# Patient Record
Sex: FEMALE | Race: WHITE | NOT HISPANIC OR LATINO | Employment: FULL TIME | URBAN - METROPOLITAN AREA
[De-identification: names, ages, dates, MRNs, and addresses within clinical notes are randomized per-mention and may not be internally consistent; named-entity substitution may affect disease eponyms.]

---

## 2018-07-26 ENCOUNTER — TRANSCRIBE ORDERS (OUTPATIENT)
Dept: ADMINISTRATIVE | Facility: HOSPITAL | Age: 23
End: 2018-07-26

## 2018-07-26 DIAGNOSIS — N63.0 LUMP OR MASS IN BREAST: Primary | ICD-10-CM

## 2018-07-30 ENCOUNTER — APPOINTMENT (OUTPATIENT)
Dept: ULTRASOUND IMAGING | Facility: HOSPITAL | Age: 23
End: 2018-07-30
Attending: NURSE PRACTITIONER

## 2018-07-31 ENCOUNTER — HOSPITAL ENCOUNTER (OUTPATIENT)
Dept: ULTRASOUND IMAGING | Facility: HOSPITAL | Age: 23
Discharge: HOME | End: 2018-07-31
Attending: NURSE PRACTITIONER | Admitting: NURSE PRACTITIONER

## 2018-07-31 DIAGNOSIS — N63.0 LUMP OR MASS IN BREAST: ICD-10-CM

## 2018-07-31 PROCEDURE — 76642 ULTRASOUND BREAST LIMITED: CPT | Performed by: RADIOLOGY

## 2018-07-31 PROCEDURE — 76642 ULTRASOUND BREAST LIMITED: CPT

## 2019-10-02 ENCOUNTER — TRANSCRIBE ORDERS (OUTPATIENT)
Dept: ADMINISTRATIVE | Facility: HOSPITAL | Age: 24
End: 2019-10-02

## 2019-10-02 DIAGNOSIS — N63.21 BREAST LUMP ON LEFT SIDE AT 1 O'CLOCK POSITION: Primary | ICD-10-CM

## 2019-10-11 ENCOUNTER — APPOINTMENT (OUTPATIENT)
Dept: ULTRASOUND IMAGING | Facility: HOSPITAL | Age: 24
End: 2019-10-11

## 2022-01-26 ENCOUNTER — OFFICE VISIT (OUTPATIENT)
Dept: CARDIOLOGY | Facility: CLINIC | Age: 27
End: 2022-01-26

## 2022-01-26 VITALS
DIASTOLIC BLOOD PRESSURE: 58 MMHG | WEIGHT: 126 LBS | TEMPERATURE: 97.8 F | HEART RATE: 80 BPM | HEIGHT: 63 IN | SYSTOLIC BLOOD PRESSURE: 110 MMHG | BODY MASS INDEX: 22.32 KG/M2

## 2022-01-26 DIAGNOSIS — R01.1 MURMUR, CARDIAC: ICD-10-CM

## 2022-01-26 DIAGNOSIS — I34.0 MITRAL VALVE INSUFFICIENCY, UNSPECIFIED ETIOLOGY: ICD-10-CM

## 2022-01-26 DIAGNOSIS — R00.2 PALPITATIONS: ICD-10-CM

## 2022-01-26 DIAGNOSIS — R06.02 SHORTNESS OF BREATH: Primary | ICD-10-CM

## 2022-01-26 DIAGNOSIS — R07.2 PRECORDIAL PAIN: ICD-10-CM

## 2022-01-26 PROCEDURE — 93000 ELECTROCARDIOGRAM COMPLETE: CPT | Performed by: INTERNAL MEDICINE

## 2022-01-26 PROCEDURE — 99204 OFFICE O/P NEW MOD 45 MIN: CPT | Performed by: INTERNAL MEDICINE

## 2022-01-26 NOTE — PROGRESS NOTES
Chief Complaint   Patient presents with   • Establish Care     PCP ref, murmur,palpitations   • Heart Murmur     newly diagnosed by PCP   • Shortness of Breath     with exertion at times, exercises 4-5 days a week, will occurs with some episodes but maybe not all, can occur just climbing stairs at times, symptoms resolve after resting   • Palpitations     started 6-8 months ago, contributed to being in PPE gear and under a lot of stress, has recently been having some dizziness associated with the palpitations which was more concerning. Now occuring daily   • Cardiac history     wore a monitor a few years ago at cardiololgy office in [place], requesting results. Has never had any other testing other than random EKG.    • Labs     most recent labs in chart        CARDIAC COMPLAINTS  dyspnea, palpitations and Heart Murmur      Subjective   Inocencia Mobley is a 26 y.o. female came in today for her initial cardiac evaluation.  She had no previously diagnosed cardiac history other than palpitation which has been going on for few years.  She was seen by a cardiologist in [place] few years ago and had monitor placed which did not show any significant arrhythmia.  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  She started noticing increasing shortness of breath and increasing palpitation.  The shortness of breath does get worse when she is wearing her PPE gear.  She is also has been having a lot of stress working in the Covid unit she started noticing shortness of breath which occurs mostly when she exercise and sometime it occurs even minimal exertion as climbing stairs.  The symptoms do get better with rest she does noticed the heart skipping and racing at that time.  She does notice some occasional dizziness.  When she was seen at your office he also heard a heart murmur which is new to her.  She denies having any problems as a child other than having recommend strep throat.  She  did undergo a tonsillectomy at the age of 21 for severe tonsillitis secondary to strep infection.  She did undergo some lab work recently at your office and found to have a normal TSH, borderline low T3 uptake, magnesium 2.2, blood count and platelets, normal cholesterol at 149 with the LDL of 85.  Her renal function and liver function normal.  Her blood sugar was normal at 84.  She has no history of smoking.  She drinks occasionally.  She does not drink any energy drinks.  Hypertension, hypercholesterolemia does run in the family.    Past Surgical History:   Procedure Laterality Date   • CONVERTED (HISTORICAL) HOLTER  01/14/2019    @ [facility] 83. Rare PAC & 1 PVC       Current Outpatient Medications   Medication Sig Dispense Refill   • levonorgestrel (KYLEENA) 19.5 MG intrauterine device IUD 1 each by Intrauterine route 1 (One) Time.       No current facility-administered medications for this visit.           ALLERGIES:  Augmentin [amoxicillin-pot clavulanate]    Past Medical History:   Diagnosis Date   • Heart murmur    • History of cholecystectomy    • History of oral surgery    • History of tonsillectomy        Social History     Tobacco Use   Smoking Status Never Smoker   Smokeless Tobacco Never Used          Family History   Problem Relation Age of Onset   • Hypertension Mother    • Hyperlipidemia Mother    • Hypertension Father    • Hyperlipidemia Father    • Hyperlipidemia Maternal Grandmother    • Hypertension Maternal Grandmother    • Heart attack Maternal Grandfather    • Heart disease Maternal Grandfather 50        multiple stents and CABG   • Hypertension Maternal Grandfather    • Hyperlipidemia Maternal Grandfather    • Breast cancer Paternal Grandmother    • Hypertension Paternal Grandmother    • Hyperlipidemia Paternal Grandmother    • Other Paternal Grandfather         unknown   • Ovarian cancer Neg Hx        Review of Systems   Constitutional: Negative for decreased appetite and  "malaise/fatigue.   HENT: Negative for congestion and sore throat.    Eyes: Negative for blurred vision.   Cardiovascular: Positive for chest pain, dyspnea on exertion and palpitations.   Respiratory: Positive for shortness of breath. Negative for snoring.    Endocrine: Negative for cold intolerance and heat intolerance.   Hematologic/Lymphatic: Negative for adenopathy. Does not bruise/bleed easily.   Skin: Negative for itching, nail changes and skin cancer.   Musculoskeletal: Negative for arthritis and myalgias.   Gastrointestinal: Negative for abdominal pain, dysphagia and heartburn.   Genitourinary: Negative for bladder incontinence and frequency.   Neurological: Negative for dizziness, light-headedness, seizures and vertigo.   Psychiatric/Behavioral: Negative for altered mental status.   Allergic/Immunologic: Negative for environmental allergies and hives.       Diabetes- No  Thyroid- normal    Objective     /58 (BP Location: Left arm)   Pulse 80   Temp 97.8 °F (36.6 °C)   Ht 160 cm (63\")   Wt 57.2 kg (126 lb)   BMI 22.32 kg/m²     Vitals and nursing note reviewed.   Constitutional:       Appearance: Healthy appearance. Not in distress.   Eyes:      Conjunctiva/sclera: Conjunctivae normal.      Pupils: Pupils are equal, round, and reactive to light.   HENT:      Head: Normocephalic.   Pulmonary:      Effort: Pulmonary effort is normal.      Breath sounds: Normal breath sounds.   Cardiovascular:      PMI at left midclavicular line. Normal rate. Regular rhythm. Fixed split S2.      Murmurs: There is a systolic murmur.   Abdominal:      General: Bowel sounds are normal.      Palpations: Abdomen is soft.   Musculoskeletal: Normal range of motion.      Cervical back: Normal range of motion and neck supple. Skin:     General: Skin is warm and dry.   Neurological:      Mental Status: Alert, oriented to person, place, and time and oriented to person, place and time.           ECG 12 Lead    Date/Time: " 1/26/2022 12:47 PM  Performed by: Janette Bucio MD  Authorized by: Janette Bucio MD   Previous ECG: no previous ECG available  Rhythm: sinus rhythm and sinus arrhythmia  Rate: normal  QRS axis: normal    Clinical impression: normal ECG              Assessment/Plan   Patient's Body mass index is 22.32 kg/m². indicating that she is within normal range (BMI 18.5-24.9). No BMI management plan needed..     Diagnoses and all orders for this visit:    1. Shortness of breath (Primary)  -     Cancel: Adult Transthoracic Echo Complete W/ Cont if Necessary Per Protocol; Future  -     Adult Transthoracic Echo Complete W/ Cont if Necessary Per Protocol; Future    2. Murmur, cardiac  -     Cancel: Adult Transthoracic Echo Complete W/ Cont if Necessary Per Protocol; Future  -     Antistreptolysin O (ASO) Titer; Future  -     Adult Transthoracic Echo Complete W/ Cont if Necessary Per Protocol; Future    3. Palpitations  -     Holter Monitor - 72 Hour Up To 15 Days; Future  -     Antistreptolysin O (ASO) Titer; Future  -     Adult Transthoracic Echo Complete W/ Cont if Necessary Per Protocol; Future    4. Mitral valve insufficiency, unspecified etiology    5. Precordial pain  -     High Sensitivity CRP; Future  -     Antistreptolysin O (ASO) Titer; Future       At baseline her heart rate is stable.  Her blood pressure is within normal limit.  Her EKG shows sinus rhythm with sinus arrhythmia.  Her clinical examination reveals a BMI of 22.  She does have slightly loud and split second heart sound.  She does have a soft systolic murmur.    Regarding the shortness of breath, it could be secondary to the mask she has to wear but need to rule out other causes which includes cardiac.  I schedule her to undergo an echocardiogram to evaluate the LV function, valvular structures and also going to do a bubble study to rule out PFO since she also has history of significant migraine headache.    Regarding the cardiac murmur, she  does have some mitral regurgitation murmur.  She did have a history of recurrent strep throat.  Advised her to check her ASO titer also.  Since she does have some chest pain when she exerts herself significantly I also advised her to check a CRP level.    Regarding the palpitation, it could be related to sinus tachycardia but other arrhythmia needs to be ruled out.  I will place a Holter monitor to see what kind of arrhythmia she has.  If there is recurrent sinus tachycardia then low-dose of beta-blockers can be tried.    Regarding the mitral regurgitation, it appears to be mild.  Will review the echocardiogram to rule out any rheumatic etiology and also check the ASO titer    Based on the results, further recommendation including addition of beta-blockers will be considered.               Electronically signed by Janette Bucio MD January 26, 2022 12:30 EST

## 2022-01-27 ENCOUNTER — PATIENT ROUNDING (BHMG ONLY) (OUTPATIENT)
Dept: CARDIOLOGY | Facility: CLINIC | Age: 27
End: 2022-01-27

## 2022-01-27 NOTE — PROGRESS NOTES
January 27, 2022    Hello, may I speak with Inocencia Mobley?    My name is ALEJA AJ      I am  with [facility]  [facility]  [address] [place] [address] [phone].    Before we get started may I verify your date of birth? 1995    I am calling to officially welcome you to our practice and ask about your recent visit. Is this a good time to talk? yes    Tell me about your visit with us. What things went well?  EVERYTHING WAS EXCEPTIONAL        We're always looking for ways to make our patients' experiences even better. Do you have recommendations on ways we may improve?  no-EVERYONE WAS EFFICIENT AND VERY KIND     Overall were you satisfied with your first visit to our practice? yes       I appreciate you taking the time to speak with me today. Is there anything else I can do for you? No-THANK YOU      Thank you, and have a great day.

## 2022-02-01 ENCOUNTER — OUTSIDE FACILITY SERVICE (OUTPATIENT)
Dept: CARDIOLOGY | Facility: CLINIC | Age: 27
End: 2022-02-01

## 2022-02-01 PROCEDURE — 93306 TTE W/DOPPLER COMPLETE: CPT | Performed by: INTERNAL MEDICINE

## 2022-02-21 ENCOUNTER — OFFICE VISIT (OUTPATIENT)
Dept: CARDIOLOGY | Facility: CLINIC | Age: 27
End: 2022-02-21

## 2022-02-21 VITALS
DIASTOLIC BLOOD PRESSURE: 70 MMHG | WEIGHT: 125 LBS | BODY MASS INDEX: 22.15 KG/M2 | SYSTOLIC BLOOD PRESSURE: 108 MMHG | HEART RATE: 76 BPM | TEMPERATURE: 97.8 F | HEIGHT: 63 IN

## 2022-02-21 DIAGNOSIS — I27.20 PHT (PULMONARY HYPERTENSION): ICD-10-CM

## 2022-02-21 DIAGNOSIS — R06.02 SHORTNESS OF BREATH: Primary | ICD-10-CM

## 2022-02-21 DIAGNOSIS — R60.0 BILATERAL LEG EDEMA: ICD-10-CM

## 2022-02-21 DIAGNOSIS — R01.1 MURMUR, CARDIAC: ICD-10-CM

## 2022-02-21 PROCEDURE — 99214 OFFICE O/P EST MOD 30 MIN: CPT | Performed by: INTERNAL MEDICINE

## 2022-02-21 NOTE — PROGRESS NOTES
Chief Complaint   Patient presents with   • Follow-up     for cardiac management   • Shortness of Breath     still having some SOB with exertion but not as lightheaded as she was   • Lab     had labs at hospital, we did not get results, will print and put in door.        CARDIAC COMPLAINTS  dyspnea and palpitations        Subjective   Inocencia Mobley is a 26 y.o. female who came in today for her follow-up visit.  She was referred to me for palpitation and increasing shortness of breath.  The shortness of breath seems to be getting worse when she is wearing her PPE in  ICU.  She did wear a Holter monitor which showed an average heart rate of 81.  She did have some sinus tachycardia and she has some rare PAC or PVC.  She also underwent an echocardiogram which was technically limited but approximately the PA pressure was elevated.  They did not do the bubble study as recommended.  She came today stating that she still has some shortness of breath but better than before and she is not getting the dizziness as she before.  Still has some occasional palpitation.  Her lab work which includes CRP was normal but the ASO titer was little high.  She did undergo a tonsillectomy in the past after having multiple strep infection.  On questioning now she said that she has been having leg edema bilaterally for few years and she was advised to wear a wrap pressure stockings and she also complain of having some pain in the calf muscle.              Cardiac History  Past Surgical History:   Procedure Laterality Date   • CONVERTED (HISTORICAL) HOLTER  01/14/2019    @ [place] 83. Rare PAC & 1 PVC   • CONVERTED (HISTORICAL) HOLTER  02/10/2022    7 [facility]. [facility] 82. . Rare PAC & PVC   • ECHO - CONVERTED  02/01/2022    @ [facility]. TLS. EF 60%. Trace MRMerly RVSP- 55 mmHg       Current Outpatient Medications   Medication Sig Dispense Refill   • levonorgestrel (KYLEENA) 19.5 MG intrauterine device IUD 1 each by Intrauterine route 1 (One) Time.        No current facility-administered medications for this visit.       Allergies  :  Augmentin [amoxicillin-pot clavulanate]       Past Medical History:   Diagnosis Date   • Heart murmur    • History of cholecystectomy    • History of oral surgery    • History of tonsillectomy        Social History     Socioeconomic History   • Marital status: Single   Tobacco Use   • Smoking status: Never Smoker   • Smokeless tobacco: Never Used   Vaping Use   • Vaping Use: Never used   Substance and Sexual Activity   • Alcohol use: Yes     Comment: only socially, a drink every 4 months   • Drug use: Never       Family History   Problem Relation Age of Onset   • Hypertension Mother    • Hyperlipidemia Mother    • Hypertension Father    • Hyperlipidemia Father    • Hyperlipidemia Maternal Grandmother    • Hypertension Maternal Grandmother    • Heart attack Maternal Grandfather    • Heart disease Maternal Grandfather 50        multiple stents and CABG   • Hypertension Maternal Grandfather    • Hyperlipidemia Maternal Grandfather    • Breast cancer Paternal Grandmother    • Hypertension Paternal Grandmother    • Hyperlipidemia Paternal Grandmother    • Other Paternal Grandfather         unknown   • Ovarian cancer Neg Hx        Review of Systems   Constitutional: Negative for decreased appetite and malaise/fatigue.   HENT: Negative for congestion and sore throat.    Eyes: Negative for blurred vision.   Cardiovascular: Positive for dyspnea on exertion, leg swelling and palpitations. Negative for chest pain.   Respiratory: Positive for shortness of breath. Negative for snoring.    Endocrine: Negative for cold intolerance and heat intolerance.   Hematologic/Lymphatic: Negative for adenopathy. Does not bruise/bleed easily.   Skin: Negative for itching, nail changes and skin cancer.   Musculoskeletal: Negative for arthritis and myalgias.   Gastrointestinal: Negative for abdominal pain, dysphagia and heartburn.   Genitourinary: Negative for  "bladder incontinence and frequency.   Neurological: Negative for dizziness, light-headedness, seizures and vertigo.   Psychiatric/Behavioral: Negative for altered mental status.   Allergic/Immunologic: Negative for environmental allergies and hives.       Diabetes- No  Thyroid- normal    Objective     /70   Pulse 76   Temp 97.8 °F (36.6 °C)   Ht 160 cm (63\")   Wt 56.7 kg (125 lb)   BMI 22.14 kg/m²     Vitals and nursing note reviewed.   Constitutional:       Appearance: Healthy appearance. Not in distress.   Eyes:      Conjunctiva/sclera: Conjunctivae normal.      Pupils: Pupils are equal, round, and reactive to light.   HENT:      Head: Normocephalic.   Pulmonary:      Effort: Pulmonary effort is normal.      Breath sounds: Normal breath sounds.   Cardiovascular:      PMI at left midclavicular line. Normal rate. Regular rhythm.      Murmurs: There is a systolic murmur.   Abdominal:      General: Bowel sounds are normal.      Palpations: Abdomen is soft.   Musculoskeletal: Normal range of motion.      Cervical back: Normal range of motion and neck supple. Skin:     General: Skin is warm and dry.   Neurological:      Mental Status: Alert, oriented to person, place, and time and oriented to person, place and time.       Procedures            Assessment/Plan   Patient's Body mass index is 22.14 kg/m². indicating that she is within normal range (BMI 18.5-24.9). No BMI management plan needed..     Diagnoses and all orders for this visit:    1. Shortness of breath (Primary)  -     CT Chest Without Contrast Diagnostic; Future  -     D-dimer, Quantitative; Future  -     XR Chest PA & Lateral; Future    2. PHT (pulmonary hypertension) (HCC)  -     CT Chest Without Contrast Diagnostic; Future  -     D-dimer, Quantitative; Future  -     CBC & Differential; Future  -     XR Chest PA & Lateral; Future    3. Murmur, cardiac    4. Bilateral leg edema  -     D-dimer, Quantitative; Future       At baseline her heart rate " is stable.  Her blood pressure is normal.  Her BMI is still within normal limit.  Her cardiovascular examination is unremarkable other than the second heart sound.  She did not have any leg edema at this time    Regarding the shortness of breath, given the fact that she does have some pulmonary pretension is worrisome.  I advised her to check a D-dimer level and I also schedule her to undergo a CT of the chest without contrast to rule out pulmonary fibrosis.  She is also advised to have an x-ray chest done.  If the D-dimer comes up abnormal, she may need to undergo VQ scan to rule out chronic PE.    Regarding the pulmonary hypertension I also advised her to check the CBC.  I discussed with her about the different causes for the pulmonary hypertension.  If no cause seems to be here now then she may need to repeat echocardiogram in 6 months.  If the PA pressure as gone up in the next 6 months then she needs further work-up including right heart catheterization    Regarding the cardiac murmur, it appears to be secondary to the tricuspid regurgitation    Regarding her history of leg edema in the past.  If the D-dimer is negative and then she may need to undergo venous Doppler ultrasound to rule out any venous insufficiency.                  Electronically signed by Janette Bucio MD February 21, 2022 15:47 EST

## 2022-03-15 ENCOUNTER — APPOINTMENT (OUTPATIENT)
Dept: WOMENS IMAGING | Facility: HOSPITAL | Age: 27
End: 2022-03-15

## 2022-03-15 PROCEDURE — 76641 ULTRASOUND BREAST COMPLETE: CPT | Performed by: RADIOLOGY

## 2022-04-19 ENCOUNTER — APPOINTMENT (OUTPATIENT)
Dept: WOMENS IMAGING | Facility: HOSPITAL | Age: 27
End: 2022-04-19

## 2022-04-19 PROCEDURE — 19083 BX BREAST 1ST LESION US IMAG: CPT | Performed by: RADIOLOGY

## 2022-04-19 PROCEDURE — A4648 IMPLANTABLE TISSUE MARKER: HCPCS | Performed by: RADIOLOGY

## 2022-11-07 ENCOUNTER — LAB (OUTPATIENT)
Dept: LAB | Facility: HOSPITAL | Age: 27
End: 2022-11-07

## 2022-11-07 ENCOUNTER — TRANSCRIBE ORDERS (OUTPATIENT)
Dept: LAB | Facility: HOSPITAL | Age: 27
End: 2022-11-07

## 2022-11-07 DIAGNOSIS — O03.9 THREATENED ABORTION, DELIVERED: Primary | ICD-10-CM

## 2022-11-07 LAB — HCG INTACT+B SERPL-ACNC: 7.98 MIU/ML

## 2022-11-07 PROCEDURE — 36415 COLL VENOUS BLD VENIPUNCTURE: CPT | Performed by: ADVANCED PRACTICE MIDWIFE

## 2022-11-07 PROCEDURE — 84702 CHORIONIC GONADOTROPIN TEST: CPT | Performed by: ADVANCED PRACTICE MIDWIFE
